# Patient Record
Sex: MALE | Race: OTHER | HISPANIC OR LATINO | URBAN - METROPOLITAN AREA
[De-identification: names, ages, dates, MRNs, and addresses within clinical notes are randomized per-mention and may not be internally consistent; named-entity substitution may affect disease eponyms.]

---

## 2023-06-22 ENCOUNTER — INPATIENT (INPATIENT)
Facility: HOSPITAL | Age: 73
LOS: 3 days | Discharge: HOME | DRG: 337 | End: 2023-06-26
Attending: STUDENT IN AN ORGANIZED HEALTH CARE EDUCATION/TRAINING PROGRAM | Admitting: STUDENT IN AN ORGANIZED HEALTH CARE EDUCATION/TRAINING PROGRAM
Payer: COMMERCIAL

## 2023-06-22 VITALS
DIASTOLIC BLOOD PRESSURE: 80 MMHG | HEART RATE: 56 BPM | HEIGHT: 63 IN | SYSTOLIC BLOOD PRESSURE: 125 MMHG | TEMPERATURE: 98 F | WEIGHT: 200.62 LBS | OXYGEN SATURATION: 98 % | RESPIRATION RATE: 16 BRPM

## 2023-06-22 DIAGNOSIS — Z98.890 OTHER SPECIFIED POSTPROCEDURAL STATES: Chronic | ICD-10-CM

## 2023-06-22 DIAGNOSIS — Z93.3 COLOSTOMY STATUS: Chronic | ICD-10-CM

## 2023-06-22 DIAGNOSIS — Z87.19 PERSONAL HISTORY OF OTHER DISEASES OF THE DIGESTIVE SYSTEM: Chronic | ICD-10-CM

## 2023-06-22 LAB — GLUCOSE BLDC GLUCOMTR-MCNC: 101 MG/DL — HIGH (ref 70–99)

## 2023-06-22 PROCEDURE — 88302 TISSUE EXAM BY PATHOLOGIST: CPT | Mod: 26

## 2023-06-22 DEVICE — MESH HERNIA VENTRAL / INCISIONAL PARIETEX PROGRIP 30 X 15CM: Type: IMPLANTABLE DEVICE | Site: BILATERAL | Status: FUNCTIONAL

## 2023-06-22 RX ORDER — ACETAMINOPHEN 500 MG
1000 TABLET ORAL ONCE
Refills: 0 | Status: COMPLETED | OUTPATIENT
Start: 2023-06-22 | End: 2023-06-22

## 2023-06-22 RX ORDER — ONDANSETRON 8 MG/1
4 TABLET, FILM COATED ORAL EVERY 8 HOURS
Refills: 0 | Status: DISCONTINUED | OUTPATIENT
Start: 2023-06-22 | End: 2023-06-26

## 2023-06-22 RX ORDER — METOPROLOL TARTRATE 50 MG
25 TABLET ORAL DAILY
Refills: 0 | Status: DISCONTINUED | OUTPATIENT
Start: 2023-06-23 | End: 2023-06-26

## 2023-06-22 RX ORDER — TAMSULOSIN HYDROCHLORIDE 0.4 MG/1
0.4 CAPSULE ORAL AT BEDTIME
Refills: 0 | Status: DISCONTINUED | OUTPATIENT
Start: 2023-06-22 | End: 2023-06-26

## 2023-06-22 RX ORDER — METOPROLOL TARTRATE 50 MG
1 TABLET ORAL
Refills: 0 | DISCHARGE

## 2023-06-22 RX ORDER — HEPARIN SODIUM 5000 [USP'U]/ML
7500 INJECTION INTRAVENOUS; SUBCUTANEOUS EVERY 8 HOURS
Refills: 0 | Status: DISCONTINUED | OUTPATIENT
Start: 2023-06-22 | End: 2023-06-26

## 2023-06-22 RX ORDER — TAMSULOSIN HYDROCHLORIDE 0.4 MG/1
1 CAPSULE ORAL
Refills: 0 | DISCHARGE

## 2023-06-22 RX ORDER — SODIUM CHLORIDE 9 MG/ML
1000 INJECTION, SOLUTION INTRAVENOUS
Refills: 0 | Status: DISCONTINUED | OUTPATIENT
Start: 2023-06-22 | End: 2023-06-24

## 2023-06-22 RX ADMIN — SODIUM CHLORIDE 70 MILLILITER(S): 9 INJECTION, SOLUTION INTRAVENOUS at 16:31

## 2023-06-22 RX ADMIN — Medication 1000 MILLIGRAM(S): at 16:45

## 2023-06-22 RX ADMIN — HEPARIN SODIUM 7500 UNIT(S): 5000 INJECTION INTRAVENOUS; SUBCUTANEOUS at 19:25

## 2023-06-22 RX ADMIN — SODIUM CHLORIDE 70 MILLILITER(S): 9 INJECTION, SOLUTION INTRAVENOUS at 22:52

## 2023-06-22 RX ADMIN — TAMSULOSIN HYDROCHLORIDE 0.4 MILLIGRAM(S): 0.4 CAPSULE ORAL at 22:51

## 2023-06-22 RX ADMIN — Medication 400 MILLIGRAM(S): at 16:31

## 2023-06-22 NOTE — H&P ADULT - NSHPPHYSICALEXAM_GEN_ALL_CORE
Vital Signs Last 24 Hrs  T(C): 37.3 (22 Jun 2023 17:00), Max: 37.3 (22 Jun 2023 17:00)  T(F): 99.2 (22 Jun 2023 17:00), Max: 99.2 (22 Jun 2023 17:00)  HR: 80 (22 Jun 2023 15:50) (56 - 80)  BP: 125/85 (22 Jun 2023 15:50) (108/68 - 125/85)  BP(mean): 98 (22 Jun 2023 15:50) (82 - 98)  RR: 18 (22 Jun 2023 15:50) (7 - 26)  SpO2: 97% (22 Jun 2023 15:50) (94% - 100%)    Parameters below as of 22 Jun 2023 15:50  Patient On (Oxygen Delivery Method): nasal cannula  O2 Flow (L/min): 2    I&O's Detail    22 Jun 2023 07:01  -  22 Jun 2023 19:29  --------------------------------------------------------  IN:    Lactated Ringers: 420 mL  Total IN: 420 mL    OUT:    Bulb (mL): 30 mL    Indwelling Catheter - Urethral (mL): 548 mL  Total OUT: 578 mL    Total NET: -158 mL          General: NAD, resting comfortably in bed  C/V: NSR  Pulm: Nonlabored breathing, no respiratory distress  Abd: soft, NT/ND.  Extrem: WWP, no edema

## 2023-06-22 NOTE — H&P ADULT - NSICDXPASTMEDICALHX_GEN_ALL_CORE_FT
PAST MEDICAL HISTORY:  BPH (benign prostatic hyperplasia)     H/O ventral hernia     History of palpitations

## 2023-06-22 NOTE — CONSULT NOTE ADULT - SUBJECTIVE AND OBJECTIVE BOX
HPI: HPI:  73 YO M with PMH Of PVCs Obesity presented to [facility] for Open repair of recurrent incisional hernia of anterior abdominal wall using synthetic mesh and posterior component separation technique. patient seen post op doing well without complaints of palpitations or chets pain, patient to be monitored on tele for PVS on rhythm , tachycardia now controlled. pt has f/u with cardiology for PVC evaluation in clinic       PAST MEDICAL & SURGICAL HISTORY:  H/O ventral hernia      History of palpitations      H/O umbilical hernia repair      History of diverticulitis  COLON RESECTION          Allergies    No Known Allergies    Intolerances        MEDICATIONS  (STANDING):  acetaminophen   IVPB .. 1000 milliGRAM(s) IV Intermittent once  lactated ringers. 1000 milliLiter(s) (70 mL/Hr) IV Continuous <Continuous>  tamsulosin 0.4 milliGRAM(s) Oral at bedtime    MEDICATIONS  (PRN):  ondansetron Injectable 4 milliGRAM(s) IV Push every 8 hours PRN Nausea and/or Vomiting  oxycodone    5 mG/acetaminophen 325 mG 1 Tablet(s) Oral every 6 hours PRN Moderate Pain (4 - 6)      SOCIAL HISTORY:    FAMILY HISTORY:        Vital Signs Last 24 Hrs  T(C): 37.1 (22 Jun 2023 15:00), Max: 37.1 (22 Jun 2023 15:00)  T(F): 98.7 (22 Jun 2023 15:00), Max: 98.7 (22 Jun 2023 15:00)  HR: 80 (22 Jun 2023 15:50) (56 - 80)  BP: 125/85 (22 Jun 2023 15:50) (108/68 - 125/85)  BP(mean): 98 (22 Jun 2023 15:50) (82 - 98)  RR: 18 (22 Jun 2023 15:50) (7 - 26)  SpO2: 97% (22 Jun 2023 15:50) (94% - 100%)    Parameters below as of 22 Jun 2023 15:50  Patient On (Oxygen Delivery Method): nasal cannula  O2 Flow (L/min): 2      I&O's Summary    22 Jun 2023 07:01  -  22 Jun 2023 16:17  --------------------------------------------------------  IN: 420 mL / OUT: 578 mL / NET: -158 mL        LABS:                CAPILLARY BLOOD GLUCOSE      POCT Blood Glucose.: 101 mg/dL (22 Jun 2023 06:37)      Cultures:      PHYSICAL EXAM:  General: NAD, resting comfortably  HEENT: NC/AT, EOMI, normal hearing, no oral lesions, no LAD, neck supple  Pulmonary: Normal resp effort, CTA-B  Cardiovascular: NSR, no murmurs  Abdominal: Soft, mild tenderness dressings CDI / , no organomegaly  Groin: Soft, nontender, no ecchymosis/hematoma, no erythema, no edema.  Extremities: (+) DP/PT pulses. FROM, normal strength, no clubbing/cyanosis/erythema/edema  Neuro: A/O x 3, CNs II-XII grossly intact, normal sensation, no focal deficits  Pulses: Palpable distal pulses    RADIOLOGY & ADDITIONAL STUDIES:      ASSESSMENT:      PLAN:

## 2023-06-22 NOTE — H&P ADULT - HISTORY OF PRESENT ILLNESS
HPI:  72M with pmh of asxs PVCs, BPH, diverticulitis s/p bocanegra and reversal (~5 years ago in [place]) c/b ventral hernia s/p VHR who presents for incisional hernia repair with component separation. Saw cardiology preop and has Holter monitor (6/23) notable for 11% PVC burden. EKG notable for nonspecific ST segment changes. TTE (6/23) notable for EF 55-65%, no ischemic cardiomyopathy.     6/22- Modified alisha-stoppa repair- EBL 25, , IVF     PMH: PVCs, BPH, diverticulitis   PSH: Hartmman, incisional hernia repair  Allergies: NKDA  Medications: Toprol 25mg, Flomax 0.04  SH: Former cigar smoker, No EtOH use

## 2023-06-22 NOTE — PRE-OP CHECKLIST - SELECT TESTS ORDERED
HgBA1C 5.8/BMP/CBC/CMP/PT/PTT/INR/Urinalysis/EKG HgBA1C 5.8// /BMP/CBC/CMP/PT/PTT/INR/Urinalysis/EKG

## 2023-06-22 NOTE — BRIEF OPERATIVE NOTE - NSICDXBRIEFPROCEDURE_GEN_ALL_CORE_FT
PROCEDURES:  Open repair of recurrent incisional hernia of anterior abdominal wall using synthetic mesh and posterior component separation technique 22-Jun-2023 12:26:47  Eileen Horowitz

## 2023-06-22 NOTE — BRIEF OPERATIVE NOTE - OPERATION/FINDINGS
Midline laparotomy carried down to hernia sac. 8x10cm epigastric abdominal wall defect appreciated. Small bowel containing hernia dissected off hernia sac with careful sharp and blunt dissection. 1cm area of small bowel serosa imbricated with running vicryl. Fascial edges clean circumferentially Midline laparotomy carried down to hernia sac. 8x10cm epigastric abdominal wall defect appreciated. Small bowel containing hernia dissected off hernia sac with careful sharp and blunt dissection. 1cm area of small bowel serosa imbricated with running vicryl. Fascial edges clean circumferentially and sac sent for final. Posterior sheath dissected from rectus abdominis and reapproximated with running 2-0 PDS. Parietex Progrip mesh cut to fashion and placed retrorectus. Anterior fascia reapproximated with running Novofil. TAP block performed. 19F RAS left deep to SuQ. Camper's fascia reapproximated with running vicryl. Skin closed with Insorb stapler.

## 2023-06-22 NOTE — H&P ADULT - NSICDXPASTSURGICALHX_GEN_ALL_CORE_FT
PAST SURGICAL HISTORY:  H/O umbilical hernia repair     History of diverticulitis COLON RESECTION    Status post Carine procedure

## 2023-06-22 NOTE — H&P ADULT - ASSESSMENT
72M with pmh of asxs PVCs, BPH, diverticulitis s/p Emery and reversal (~5 years ago in [place]) c/b ventral hernia s/p VHR who presents for incisional hernia repair with component separation.     Consent signed and in chart  Ok to proceed to OR

## 2023-06-23 LAB
ANION GAP SERPL CALC-SCNC: 9 MMOL/L — SIGNIFICANT CHANGE UP (ref 5–17)
BUN SERPL-MCNC: 16 MG/DL — SIGNIFICANT CHANGE UP (ref 7–23)
CALCIUM SERPL-MCNC: 8.3 MG/DL — LOW (ref 8.4–10.5)
CHLORIDE SERPL-SCNC: 102 MMOL/L — SIGNIFICANT CHANGE UP (ref 96–108)
CO2 SERPL-SCNC: 27 MMOL/L — SIGNIFICANT CHANGE UP (ref 22–31)
CREAT SERPL-MCNC: 0.95 MG/DL — SIGNIFICANT CHANGE UP (ref 0.5–1.3)
EGFR: 85 ML/MIN/1.73M2 — SIGNIFICANT CHANGE UP
GLUCOSE SERPL-MCNC: 121 MG/DL — HIGH (ref 70–99)
HCT VFR BLD CALC: 37.3 % — LOW (ref 39–50)
HCV AB S/CO SERPL IA: 0.04 S/CO — SIGNIFICANT CHANGE UP
HCV AB SERPL-IMP: SIGNIFICANT CHANGE UP
HGB BLD-MCNC: 11.6 G/DL — LOW (ref 13–17)
MAGNESIUM SERPL-MCNC: 1.8 MG/DL — SIGNIFICANT CHANGE UP (ref 1.6–2.6)
MCHC RBC-ENTMCNC: 26.8 PG — LOW (ref 27–34)
MCHC RBC-ENTMCNC: 31.1 GM/DL — LOW (ref 32–36)
MCV RBC AUTO: 86.1 FL — SIGNIFICANT CHANGE UP (ref 80–100)
NRBC # BLD: 0 /100 WBCS — SIGNIFICANT CHANGE UP (ref 0–0)
PHOSPHATE SERPL-MCNC: 2.5 MG/DL — SIGNIFICANT CHANGE UP (ref 2.5–4.5)
PLATELET # BLD AUTO: 174 K/UL — SIGNIFICANT CHANGE UP (ref 150–400)
POTASSIUM SERPL-MCNC: 4.2 MMOL/L — SIGNIFICANT CHANGE UP (ref 3.5–5.3)
POTASSIUM SERPL-SCNC: 4.2 MMOL/L — SIGNIFICANT CHANGE UP (ref 3.5–5.3)
RBC # BLD: 4.33 M/UL — SIGNIFICANT CHANGE UP (ref 4.2–5.8)
RBC # FLD: 15.3 % — HIGH (ref 10.3–14.5)
SODIUM SERPL-SCNC: 138 MMOL/L — SIGNIFICANT CHANGE UP (ref 135–145)
WBC # BLD: 8.04 K/UL — SIGNIFICANT CHANGE UP (ref 3.8–10.5)
WBC # FLD AUTO: 8.04 K/UL — SIGNIFICANT CHANGE UP (ref 3.8–10.5)

## 2023-06-23 PROCEDURE — 99221 1ST HOSP IP/OBS SF/LOW 40: CPT

## 2023-06-23 RX ORDER — MAGNESIUM SULFATE 500 MG/ML
1 VIAL (ML) INJECTION ONCE
Refills: 0 | Status: COMPLETED | OUTPATIENT
Start: 2023-06-23 | End: 2023-06-23

## 2023-06-23 RX ORDER — SODIUM,POTASSIUM PHOSPHATES 278-250MG
1 POWDER IN PACKET (EA) ORAL ONCE
Refills: 0 | Status: COMPLETED | OUTPATIENT
Start: 2023-06-23 | End: 2023-06-23

## 2023-06-23 RX ORDER — BENZOCAINE AND MENTHOL 5; 1 G/100ML; G/100ML
1 LIQUID ORAL ONCE
Refills: 0 | Status: COMPLETED | OUTPATIENT
Start: 2023-06-23 | End: 2023-06-23

## 2023-06-23 RX ORDER — LANOLIN ALCOHOL/MO/W.PET/CERES
5 CREAM (GRAM) TOPICAL ONCE
Refills: 0 | Status: COMPLETED | OUTPATIENT
Start: 2023-06-23 | End: 2023-06-23

## 2023-06-23 RX ADMIN — HEPARIN SODIUM 7500 UNIT(S): 5000 INJECTION INTRAVENOUS; SUBCUTANEOUS at 04:41

## 2023-06-23 RX ADMIN — HEPARIN SODIUM 7500 UNIT(S): 5000 INJECTION INTRAVENOUS; SUBCUTANEOUS at 11:22

## 2023-06-23 RX ADMIN — HEPARIN SODIUM 7500 UNIT(S): 5000 INJECTION INTRAVENOUS; SUBCUTANEOUS at 19:24

## 2023-06-23 RX ADMIN — Medication 5 MILLIGRAM(S): at 04:41

## 2023-06-23 RX ADMIN — Medication 100 GRAM(S): at 10:42

## 2023-06-23 RX ADMIN — BENZOCAINE AND MENTHOL 1 LOZENGE: 5; 1 LIQUID ORAL at 00:04

## 2023-06-23 RX ADMIN — Medication 1 PACKET(S): at 10:42

## 2023-06-23 RX ADMIN — TAMSULOSIN HYDROCHLORIDE 0.4 MILLIGRAM(S): 0.4 CAPSULE ORAL at 21:15

## 2023-06-23 NOTE — PHYSICAL THERAPY INITIAL EVALUATION ADULT - GENERAL OBSERVATIONS, REHAB EVAL
PT IE completed. Chart reviewed. Patient without complaints of pain at rest, agreeable to PT. Patient received semi-supine, NAD, +tele, +3LO2NC, +abd binder intact, +1 abd RAS intact, +(L)IV, +B/L SCDs, HAL Alvarado cleared patient for treatment.

## 2023-06-23 NOTE — PHYSICAL THERAPY INITIAL EVALUATION ADULT - NSPTDISCHREC_GEN_A_CORE
Home with Home PT **pending increased gait and stairs (patient limited by 9/10 abdominal pain with mobility)

## 2023-06-23 NOTE — PHYSICAL THERAPY INITIAL EVALUATION ADULT - THERAPY FREQUENCY, PT EVAL
Patient educated on frequency of inpatient physical therapy at [facility], patient verbalized understanding./3-5x/week

## 2023-06-23 NOTE — PHYSICAL THERAPY INITIAL EVALUATION ADULT - PLANNED THERAPY INTERVENTIONS, PT EVAL
64 stair training/balance training/bed mobility training/gait training/strengthening/transfer training

## 2023-06-23 NOTE — PHYSICAL THERAPY INITIAL EVALUATION ADULT - PHYSICAL ASSIST/NONPHYSICAL ASSIST: SUPINE/SIT, REHAB EVAL
from (L)side-lying; able to bring B/L LEs off EOB with VCs; *significantly increased assist for trunk mobility 2/2 abd pain/verbal cues/nonverbal cues (demo/gestures)/1 person assist

## 2023-06-23 NOTE — PHYSICAL THERAPY INITIAL EVALUATION ADULT - PERTINENT HX OF CURRENT PROBLEM, REHAB EVAL
72M with pmh of asxs PVCs, BPH, diverticulitis s/p bocanegra and reversal (~5 years ago in [place]) c/b ventral hernia s/p VHR who presents for incisional hernia repair with component separation. Saw cardiology preop and has Holter monitor (6/23) notable for 11% PVC burden. EKG notable for nonspecific ST segment changes. TTE (6/23) notable for EF 55-65%, no ischemic cardiomyopathy. Please refer to H&P on Eads for remaining.

## 2023-06-23 NOTE — PHYSICAL THERAPY INITIAL EVALUATION ADULT - GAIT DEVIATIONS NOTED, PT EVAL
fairly steady gait, no LOB/knee buckling noted; *increased time required with turning/decreased aiyana/decreased step length

## 2023-06-23 NOTE — PROGRESS NOTE ADULT - SUBJECTIVE AND OBJECTIVE BOX
HPI: HPI:  71 YO M with PMH Of PVCs Obesity presented to [facility] for Open repair of recurrent incisional hernia of anterior abdominal wall using synthetic mesh and posterior component separation technique. patient seen post op doing well without complaints of palpitations or chets pain, patient to be monitored on tele for PVS on rhythm , tachycardia now controlled. pt has f/u with cardiology for PVC evaluation in clinic     patient seen and examined at bedside    feeling well without complaints  PVCs persistent on tele, cardiology consulted       PAST MEDICAL & SURGICAL HISTORY:  H/O ventral hernia      History of palpitations      H/O umbilical hernia repair      History of diverticulitis  COLON RESECTION          Allergies    No Known Allergies    Intolerances        MEDICATIONS  (STANDING):  acetaminophen   IVPB .. 1000 milliGRAM(s) IV Intermittent once  lactated ringers. 1000 milliLiter(s) (70 mL/Hr) IV Continuous <Continuous>  tamsulosin 0.4 milliGRAM(s) Oral at bedtime    MEDICATIONS  (PRN):  ondansetron Injectable 4 milliGRAM(s) IV Push every 8 hours PRN Nausea and/or Vomiting  oxycodone    5 mG/acetaminophen 325 mG 1 Tablet(s) Oral every 6 hours PRN Moderate Pain (4 - 6)      SOCIAL HISTORY:    FAMILY HISTORY:        Vital Signs Last 24 Hrs  T(C): 37.1 (22 Jun 2023 15:00), Max: 37.1 (22 Jun 2023 15:00)  T(F): 98.7 (22 Jun 2023 15:00), Max: 98.7 (22 Jun 2023 15:00)  HR: 80 (22 Jun 2023 15:50) (56 - 80)  BP: 125/85 (22 Jun 2023 15:50) (108/68 - 125/85)  BP(mean): 98 (22 Jun 2023 15:50) (82 - 98)  RR: 18 (22 Jun 2023 15:50) (7 - 26)  SpO2: 97% (22 Jun 2023 15:50) (94% - 100%)    Parameters below as of 22 Jun 2023 15:50  Patient On (Oxygen Delivery Method): nasal cannula  O2 Flow (L/min): 2      I&O's Summary    22 Jun 2023 07:01  -  22 Jun 2023 16:17  --------------------------------------------------------  IN: 420 mL / OUT: 578 mL / NET: -158 mL        LABS:                CAPILLARY BLOOD GLUCOSE      POCT Blood Glucose.: 101 mg/dL (22 Jun 2023 06:37)      Cultures:      PHYSICAL EXAM:  General: NAD, resting comfortably  HEENT: NC/AT, EOMI, normal hearing, no oral lesions, no LAD, neck supple  Pulmonary: Normal resp effort, CTA-B  Cardiovascular: NSR, no murmurs  Abdominal: Soft, mild tenderness dressings CDI / , no organomegaly  Groin: Soft, nontender, no ecchymosis/hematoma, no erythema, no edema.  Extremities: (+) DP/PT pulses. FROM, normal strength, no clubbing/cyanosis/erythema/edema  Neuro: A/O x 3, CNs II-XII grossly intact, normal sensation, no focal deficits  Pulses: Palpable distal pulses    RADIOLOGY & ADDITIONAL STUDIES:      ASSESSMENT:      PLAN:

## 2023-06-23 NOTE — PHYSICAL THERAPY INITIAL EVALUATION ADULT - PHYSICAL ASSIST/NONPHYSICAL ASSIST, REHAB EVAL
rolling to (L) side for abdominal comfort/verbal cues/nonverbal cues (demo/gestures)/1 person assist

## 2023-06-23 NOTE — PHYSICAL THERAPY INITIAL EVALUATION ADULT - PHYSICAL ASSIST/NONPHYSICAL ASSIST: SIT/STAND, REHAB EVAL
slightly unsteady; *increased time required to complete functional task/verbal cues/nonverbal cues (demo/gestures)/1 person assist

## 2023-06-23 NOTE — PHYSICAL THERAPY INITIAL EVALUATION ADULT - DID THE PATIENT HAVE SURGERY?
Open repair of recurrent incisional hernia of anterior abdominal wall using synthetic mesh and posterior component separation technique/yes

## 2023-06-23 NOTE — PHYSICAL THERAPY INITIAL EVALUATION ADULT - ADDITIONAL COMMENTS
Patient reports previously independent with all ADLs/IADLs prior to admission. No HHA. Denies history of mechanical falls. Patient reports "it's already been set-up that I will have 2 people staying with me to help as needed once I leave the hospital".

## 2023-06-23 NOTE — CONSULT NOTE ADULT - ATTENDING COMMENTS
Initial attending contact date  6/23/23    . See fellow note written above for details. I reviewed the fellow documentation. I have personally seen and examined this patient. I reviewed vitals, labs, medications, cardiac studies, and additional imaging. I agree with the above fellow's findings and plans as written above with the following additions/statements.    72M with pmh of asxs PVCs, BPH, diverticulitis s/p bocanegra and reversal (~5 years ago in [place]) c/b ventral hernia s/p VHR who presents for incisional hernia repair with component separation. Saw cardiology preop and has Holter monitor (6/23) notable for 11% PVC burden. EKG notable for nonspecific ST segment changes. TTE (6/23) notable for EF 55-65%, no ischemic cardiomyopathy. Recent outpatient stress echo - negative for ischemia  -On tele; NSR with rare APCs  -On exam euvolemic without cardiac complaints  -Cont metoprolol  -Fu with outpatient cardiologist upon dc

## 2023-06-23 NOTE — PROGRESS NOTE ADULT - SUBJECTIVE AND OBJECTIVE BOX
STATUS POST:  Modified Cyndi Stoppa Repair    POST OPERATIVE DAY #: 1    SUBJECTIVE: Pt seen and examined at bedside this am by surgery team. Patient is lying comfortably in bed with no complaints. Tolerating clears, pain well controlled with current regimen. Patient denies fever, nausea, vomiting, chest pain, and shortness of breath.    MEDICATIONS  (STANDING):  heparin   Injectable 7500 Unit(s) SubCutaneous every 8 hours  lactated ringers. 1000 milliLiter(s) (70 mL/Hr) IV Continuous <Continuous>  metoprolol succinate ER 25 milliGRAM(s) Oral daily  tamsulosin 0.4 milliGRAM(s) Oral at bedtime    MEDICATIONS  (PRN):  ondansetron Injectable 4 milliGRAM(s) IV Push every 8 hours PRN Nausea and/or Vomiting  oxycodone    5 mG/acetaminophen 325 mG 1 Tablet(s) Oral every 6 hours PRN Moderate Pain (4 - 6)      Vital Signs Last 24 Hrs  T(C): 37.3 (23 Jun 2023 05:06), Max: 37.3 (22 Jun 2023 17:00)  T(F): 99.2 (23 Jun 2023 05:06), Max: 99.2 (22 Jun 2023 17:00)  HR: 54 (23 Jun 2023 04:05) (54 - 80)  BP: 119/77 (23 Jun 2023 04:05) (108/68 - 125/85)  BP(mean): 94 (23 Jun 2023 04:05) (82 - 98)  RR: 17 (23 Jun 2023 04:05) (7 - 26)  SpO2: 90% (23 Jun 2023 04:05) (90% - 100%)    Parameters below as of 23 Jun 2023 04:05  Patient On (Oxygen Delivery Method): room air    Physical Exam  General: Patient is doing well and lying in bed comfortably  Constitutional: alert and oriented   Pulm: Nonlabored breathing, no respiratory distress  CV: Regular rate and rhythm, normal sinus rhythm  Abd:  soft, nontender, mildly tender. No rebound, no guarding.             Incisions: clean, dry, intact and healing well, no erythema/induration/edema  Extremities: warm, well perfused, no edema  Drains: RAS x 1, serosang    I&O's Detail    22 Jun 2023 07:01  -  23 Jun 2023 07:00  --------------------------------------------------------  IN:    Lactated Ringers: 1190 mL  Total IN: 1190 mL    OUT:    Bulb (mL): 65 mL    Indwelling Catheter - Urethral (mL): 1723 mL  Total OUT: 1788 mL    Total NET: -598 mL        LABS:             STATUS POST:  Modified Cyndi Stoppa Repair    POST OPERATIVE DAY #: 1    SUBJECTIVE: Pt seen and examined at bedside this am by surgery team. Patient is lying comfortably in bed, complains of mild nausea and unable to sleep - received melatonin. Tolerating clears, pain well controlled with current regimen. Patient denies fever, vomiting, chest pain, and shortness of breath.    MEDICATIONS  (STANDING):  heparin   Injectable 7500 Unit(s) SubCutaneous every 8 hours  lactated ringers. 1000 milliLiter(s) (70 mL/Hr) IV Continuous <Continuous>  metoprolol succinate ER 25 milliGRAM(s) Oral daily  tamsulosin 0.4 milliGRAM(s) Oral at bedtime    MEDICATIONS  (PRN):  ondansetron Injectable 4 milliGRAM(s) IV Push every 8 hours PRN Nausea and/or Vomiting  oxycodone    5 mG/acetaminophen 325 mG 1 Tablet(s) Oral every 6 hours PRN Moderate Pain (4 - 6)      Vital Signs Last 24 Hrs  T(C): 37.3 (23 Jun 2023 05:06), Max: 37.3 (22 Jun 2023 17:00)  T(F): 99.2 (23 Jun 2023 05:06), Max: 99.2 (22 Jun 2023 17:00)  HR: 54 (23 Jun 2023 04:05) (54 - 80)  BP: 119/77 (23 Jun 2023 04:05) (108/68 - 125/85)  BP(mean): 94 (23 Jun 2023 04:05) (82 - 98)  RR: 17 (23 Jun 2023 04:05) (7 - 26)  SpO2: 90% (23 Jun 2023 04:05) (90% - 100%)    Parameters below as of 23 Jun 2023 04:05  Patient On (Oxygen Delivery Method): room air    Physical Exam  General: Patient is doing well and lying in bed comfortably  Constitutional: alert and oriented   Pulm: Nonlabored breathing, no respiratory distress  CV: Regular rate and rhythm, normal sinus rhythm  Abd:  soft, nontender, mildly tender. No rebound, no guarding.             Incisions: clean, dry, intact and healing well, no erythema/induration/edema  Extremities: warm, well perfused, no edema  Drains: RAS x 1, serosang    I&O's Detail    22 Jun 2023 07:01  -  23 Jun 2023 07:00  --------------------------------------------------------  IN:    Lactated Ringers: 1190 mL  Total IN: 1190 mL    OUT:    Bulb (mL): 65 mL    Indwelling Catheter - Urethral (mL): 1723 mL  Total OUT: 1788 mL    Total NET: -598 mL        LABS:

## 2023-06-23 NOTE — CONSULT NOTE ADULT - ASSESSMENT
I:  73 YO M with PMH Of PVCs Obesity presented to [facility] for Open repair of recurrent incisional hernia of anterior abdominal wall using synthetic mesh and posterior component separation technique. patient seen post op doing well without complaints of palpitations or chets pain, patient to be monitored on tele for PVS on rhythm , tachycardia now controlled. pt has f/u with cardiology for PVC evaluation in clinic     BPH- cont tamsulosin   PVCs - monitor on tele, beta blockers for rate control   SCDs / DVT ppx as per surgery / pain management     thanks for this consult !!!
72M PMH PVCs, diverticulitis s/p Emery procedure presents for hernia repair, now POD1.      #PVCs  TELE with NSR, rare PAC, no PVC  Patient asymptomatic  -Continue home Toprol  -Outpatient Cardiology follow-up

## 2023-06-23 NOTE — CONSULT NOTE ADULT - SUBJECTIVE AND OBJECTIVE BOX
HPI:  HPI:  72M with pmh of asxs PVCs, BPH, diverticulitis s/p bocanegra and reversal (~5 years ago in [place]) c/b ventral hernia s/p VHR who presents for incisional hernia repair with component separation. Saw cardiology preop and has Holter monitor (6/23) notable for 11% PVC burden. EKG notable for nonspecific ST segment changes. TTE (6/23) notable for EF 55-65%, no ischemic cardiomyopathy.     6/22- Modified alisha-stoppa repair- EBL 25, , IVF     PMH: PVCs, BPH, diverticulitis   PSH: Hartmman, incisional hernia repair  Allergies: NKDA  Medications: Toprol 25mg, Flomax 0.04  SH: Former cigar smoker, No EtOH use                   (22 Jun 2023 19:28)      ROS: A 10-point review of systems was otherwise negative.    PAST MEDICAL & SURGICAL HISTORY:  H/O ventral hernia      History of palpitations      BPH (benign prostatic hyperplasia)      H/O umbilical hernia repair      History of diverticulitis  COLON RESECTION      Status post Carine procedure          SOCIAL HISTORY:  FAMILY HISTORY:      ALLERGIES: 	  No Known Allergies            MEDICATIONS:  heparin   Injectable 7500 Unit(s) SubCutaneous every 8 hours  lactated ringers. 1000 milliLiter(s) IV Continuous <Continuous>  metoprolol succinate ER 25 milliGRAM(s) Oral daily  ondansetron Injectable 4 milliGRAM(s) IV Push every 8 hours PRN  oxycodone    5 mG/acetaminophen 325 mG 1 Tablet(s) Oral every 6 hours PRN  tamsulosin 0.4 milliGRAM(s) Oral at bedtime      HOME MEDICATIONS:  metoprolol succinate 25 mg oral capsule, extended release: 1 orally once a day  tamsulosin 0.4 mg oral capsule: 1 orally once a day (at bedtime)      PHYSICAL EXAM:      I/O Summary 24H    IN: 1190 mL / OUT: 1788 mL / NET: -598 mL        T(F): 98.6 (06-23-23 @ 09:00), Max: 99.2 (06-22-23 @ 17:00)  HR: 60 (06-23-23 @ 08:18) (54 - 80)  BP: 109/64 (06-23-23 @ 08:18) (108/68 - 125/85)  BP(mean): 82 (06-23-23 @ 08:18) (82 - 98)  ABP: --  ABP(mean): --  RR: 17 (06-23-23 @ 08:18) (7 - 26)  SpO2: 91% (06-23-23 @ 08:18) (90% - 100%)    GEN: Awake, comfortable. NAD.   HEENT: NCAT, PERRL, EOMI. Mucosa moist. No JVD.   RESP: CTA b/l  CV: RRR, normal s1/s2. No m/r/g.  ABD: Soft, NTND. BS+  EXT: Warm. No edema, clubbing, or cyanosis.   NEURO: AAOx3. No focal deficits.      	  LABS:	 	    Cardiac Markers             CBC 06-23-23 @ 05:30                        11.6   8.04  )-----------( 174                   37.3       Hgb trend: 11.6 <--   WBC trend: 8.04 <--     CMP 06-23-23 @ 05:30    138  |  102  |  16  ----------------------------<  121<H>  4.2   |  27  |  0.95    Ca    8.3<L>      06-23-23 @ 05:30  Phos  2.5     06-23  Mg     1.8     06-23        Serum Cr trend: 0.95 <--   proBNP:   Lipid Profile:   HgA1c:   TSH:     TELEMETRY: 	    ECG:  	  RADIOLOGY:   ECHO:  STRESS:  CATH:   HPI:  HPI:  72M with pmh of asxs PVCs, BPH, diverticulitis s/p bocanegra and reversal (~5 years ago in [place]) c/b ventral hernia s/p VHR who presents for incisional hernia repair with component separation. Saw cardiology preop and has Holter monitor (6/23) notable for 11% PVC burden. EKG notable for nonspecific ST segment changes. TTE (6/23) notable for EF 55-65%, no ischemic cardiomyopathy.     6/22- Modified alisha-stoppa repair- EBL 25, , IVF     PMH: PVCs, BPH, diverticulitis   PSH: Hartmman, incisional hernia repair  Allergies: NKDA  Medications: Toprol 25mg, Flomax 0.04  SH: Former cigar smoker, No EtOH use                   (22 Jun 2023 19:28)      Today, patient denies chest pain, SOB, palpitations      ROS: A 10-point review of systems was otherwise negative.    PAST MEDICAL & SURGICAL HISTORY:  H/O ventral hernia      History of palpitations      BPH (benign prostatic hyperplasia)      H/O umbilical hernia repair      History of diverticulitis  COLON RESECTION      Status post Carine procedure          SOCIAL HISTORY:  FAMILY HISTORY:      ALLERGIES: 	  No Known Allergies            MEDICATIONS:  heparin   Injectable 7500 Unit(s) SubCutaneous every 8 hours  lactated ringers. 1000 milliLiter(s) IV Continuous <Continuous>  metoprolol succinate ER 25 milliGRAM(s) Oral daily  ondansetron Injectable 4 milliGRAM(s) IV Push every 8 hours PRN  oxycodone    5 mG/acetaminophen 325 mG 1 Tablet(s) Oral every 6 hours PRN  tamsulosin 0.4 milliGRAM(s) Oral at bedtime      HOME MEDICATIONS:  metoprolol succinate 25 mg oral capsule, extended release: 1 orally once a day  tamsulosin 0.4 mg oral capsule: 1 orally once a day (at bedtime)      PHYSICAL EXAM:      I/O Summary 24H    IN: 1190 mL / OUT: 1788 mL / NET: -598 mL        T(F): 98.6 (06-23-23 @ 09:00), Max: 99.2 (06-22-23 @ 17:00)  HR: 60 (06-23-23 @ 08:18) (54 - 80)  BP: 109/64 (06-23-23 @ 08:18) (108/68 - 125/85)  BP(mean): 82 (06-23-23 @ 08:18) (82 - 98)  ABP: --  ABP(mean): --  RR: 17 (06-23-23 @ 08:18) (7 - 26)  SpO2: 91% (06-23-23 @ 08:18) (90% - 100%)    GEN: Awake, comfortable. NAD.   HEENT: NCAT, PERRL, EOMI. Mucosa moist. No JVD.   RESP: CTA b/l  CV: RRR, normal s1/s2. No m/r/g.  ABD: Soft, NTND. BS+  EXT: Warm. No edema, clubbing, or cyanosis.   NEURO: AAOx3. No focal deficits.      	  LABS:	 	    Cardiac Markers             CBC 06-23-23 @ 05:30                        11.6   8.04  )-----------( 174                   37.3       Hgb trend: 11.6 <--   WBC trend: 8.04 <--     CMP 06-23-23 @ 05:30    138  |  102  |  16  ----------------------------<  121<H>  4.2   |  27  |  0.95    Ca    8.3<L>      06-23-23 @ 05:30  Phos  2.5     06-23  Mg     1.8     06-23        Serum Cr trend: 0.95 <--   proBNP:   Lipid Profile:   HgA1c:   TSH:     TELEMETRY: 	    ECG:  	  RADIOLOGY:   ECHO:  STRESS:  CATH:

## 2023-06-24 LAB
ANION GAP SERPL CALC-SCNC: 6 MMOL/L — SIGNIFICANT CHANGE UP (ref 5–17)
BILIRUB SERPL-MCNC: 0.6 MG/DL — SIGNIFICANT CHANGE UP (ref 0.2–1.2)
BUN SERPL-MCNC: 15 MG/DL — SIGNIFICANT CHANGE UP (ref 7–23)
CALCIUM SERPL-MCNC: 7.7 MG/DL — LOW (ref 8.4–10.5)
CHLORIDE SERPL-SCNC: 101 MMOL/L — SIGNIFICANT CHANGE UP (ref 96–108)
CO2 SERPL-SCNC: 27 MMOL/L — SIGNIFICANT CHANGE UP (ref 22–31)
CREAT SERPL-MCNC: 0.9 MG/DL — SIGNIFICANT CHANGE UP (ref 0.5–1.3)
EGFR: 90 ML/MIN/1.73M2 — SIGNIFICANT CHANGE UP
GLUCOSE SERPL-MCNC: 121 MG/DL — HIGH (ref 70–99)
HCT VFR BLD CALC: 35.5 % — LOW (ref 39–50)
HGB BLD-MCNC: 11.2 G/DL — LOW (ref 13–17)
INR BLD: 1.14 — SIGNIFICANT CHANGE UP (ref 0.88–1.16)
MAGNESIUM SERPL-MCNC: 2 MG/DL — SIGNIFICANT CHANGE UP (ref 1.6–2.6)
MCHC RBC-ENTMCNC: 26.9 PG — LOW (ref 27–34)
MCHC RBC-ENTMCNC: 31.5 GM/DL — LOW (ref 32–36)
MCV RBC AUTO: 85.3 FL — SIGNIFICANT CHANGE UP (ref 80–100)
MELD SCORE WITH DIALYSIS: 23 POINTS — SIGNIFICANT CHANGE UP
MELD SCORE WITHOUT DIALYSIS: 8 POINTS — SIGNIFICANT CHANGE UP
NRBC # BLD: 0 /100 WBCS — SIGNIFICANT CHANGE UP (ref 0–0)
PHOSPHATE SERPL-MCNC: 2.2 MG/DL — LOW (ref 2.5–4.5)
PLATELET # BLD AUTO: 166 K/UL — SIGNIFICANT CHANGE UP (ref 150–400)
POTASSIUM SERPL-MCNC: 4.2 MMOL/L — SIGNIFICANT CHANGE UP (ref 3.5–5.3)
POTASSIUM SERPL-SCNC: 4.2 MMOL/L — SIGNIFICANT CHANGE UP (ref 3.5–5.3)
PROTHROM AB SERPL-ACNC: 13.6 SEC — HIGH (ref 10.5–13.4)
RBC # BLD: 4.16 M/UL — LOW (ref 4.2–5.8)
RBC # FLD: 15.5 % — HIGH (ref 10.3–14.5)
SODIUM SERPL-SCNC: 134 MMOL/L — LOW (ref 135–145)
WBC # BLD: 8.9 K/UL — SIGNIFICANT CHANGE UP (ref 3.8–10.5)
WBC # FLD AUTO: 8.9 K/UL — SIGNIFICANT CHANGE UP (ref 3.8–10.5)

## 2023-06-24 RX ORDER — DIAZEPAM 5 MG
2 TABLET ORAL ONCE
Refills: 0 | Status: DISCONTINUED | OUTPATIENT
Start: 2023-06-24 | End: 2023-06-24

## 2023-06-24 RX ORDER — SODIUM,POTASSIUM PHOSPHATES 278-250MG
1 POWDER IN PACKET (EA) ORAL ONCE
Refills: 0 | Status: COMPLETED | OUTPATIENT
Start: 2023-06-24 | End: 2023-06-24

## 2023-06-24 RX ADMIN — Medication 1 PACKET(S): at 11:02

## 2023-06-24 RX ADMIN — HEPARIN SODIUM 7500 UNIT(S): 5000 INJECTION INTRAVENOUS; SUBCUTANEOUS at 19:38

## 2023-06-24 RX ADMIN — Medication 25 MILLIGRAM(S): at 05:04

## 2023-06-24 RX ADMIN — Medication 2 MILLIGRAM(S): at 04:40

## 2023-06-24 RX ADMIN — TAMSULOSIN HYDROCHLORIDE 0.4 MILLIGRAM(S): 0.4 CAPSULE ORAL at 22:20

## 2023-06-24 RX ADMIN — HEPARIN SODIUM 7500 UNIT(S): 5000 INJECTION INTRAVENOUS; SUBCUTANEOUS at 11:09

## 2023-06-24 RX ADMIN — HEPARIN SODIUM 7500 UNIT(S): 5000 INJECTION INTRAVENOUS; SUBCUTANEOUS at 04:38

## 2023-06-24 NOTE — PROVIDER CONTACT NOTE (OTHER) - SITUATION
dry bright red blood on tip of penis and on front of patient gown
pt voids 50ml but is retaining 631 as per bladder scanner. pt doesn't want to be bladder scan due to bleeding last time it was done. states it was traumatic

## 2023-06-24 NOTE — PROGRESS NOTE ADULT - SUBJECTIVE AND OBJECTIVE BOX
Interventional Cardiology Adult Progress Note    Subjective Assessment:  Denies any chest pain or dyspnea  	  MEDICATIONS:  metoprolol succinate ER 25 milliGRAM(s) Oral daily        ondansetron Injectable 4 milliGRAM(s) IV Push every 8 hours PRN  oxycodone    5 mG/acetaminophen 325 mG 1 Tablet(s) Oral every 6 hours PRN        heparin   Injectable 7500 Unit(s) SubCutaneous every 8 hours  lactated ringers. 1000 milliLiter(s) IV Continuous <Continuous>  tamsulosin 0.4 milliGRAM(s) Oral at bedtime      	    [PHYSICAL EXAM:  TELEMETRY:  T(C): 37.2 (06-24-23 @ 09:00), Max: 37.9 (06-24-23 @ 04:59)  HR: 58 (06-24-23 @ 08:30) (58 - 88)  BP: 112/74 (06-24-23 @ 08:30) (112/74 - 133/75)  RR: 17 (06-24-23 @ 08:30) (17 - 17)  SpO2: 97% (06-24-23 @ 08:30) (92% - 97%)  Wt(kg): --  I&O's Summary    23 Jun 2023 07:01  -  24 Jun 2023 07:00  --------------------------------------------------------  IN: 1290 mL / OUT: 1930 mL / NET: -640 mL    24 Jun 2023 07:01  -  24 Jun 2023 13:39  --------------------------------------------------------  IN: 210 mL / OUT: 105 mL / NET: 105 mL        Desai:  Central/PICC/Mid Line:                                         Appearance: Normal	  HEENT:   Normal oral mucosa, PERRL, EOMI	  Neck: Supple,  - JVD; Carotid Bruit   Cardiovascular: Normal S1 S2, No JVD, ETHAN  Respiratory: Lungs clear to auscultation	  Gastrointestinal:  Soft, Non-tender, 	  Extremities: Normal range of motion, No edema    	    ECG:  	NSR  RADIOLOGY:   DIAGNOSTIC TESTING:  [ ] Echocardiogram:  [ ]  Catheterization:  [ ] Stress Test:    [ ] WILLY  OTHER: 	    LABS:	 	  CARDIAC MARKERS:                                  11.2   8.90  )-----------( 166      ( 24 Jun 2023 05:30 )             35.5     06-24    134<L>  |  101  |  15  ----------------------------<  121<H>  4.2   |  27  |  0.90    Ca    7.7<L>      24 Jun 2023 05:30  Phos  2.2     06-24  Mg     2.0     06-24    TPro  x   /  Alb  x   /  TBili  0.6  /  DBili  x   /  AST  x   /  ALT  x   /  AlkPhos  x   06-24    proBNP:   Lipid Profile:   HgA1c:   TSH:   PT/INR - ( 24 Jun 2023 05:30 )   PT: 13.6 sec;   INR: 1.14

## 2023-06-24 NOTE — CHART NOTE - NSCHARTNOTEFT_GEN_A_CORE
Patient repeatedly refusing catheterization for urinary retention. Patient is voiding frequent small voids however PVR remains consistently high. Patient educated about risks of urinary retention, including pain, UTI, and reflux. Patient states that he understands risks and would like to continue ambulating and attempting to void on his own. Resident will continue to monitor serial PVR and continue discussions regarding catheterization.

## 2023-06-24 NOTE — PROGRESS NOTE ADULT - SUBJECTIVE AND OBJECTIVE BOX
HPI: HPI:  71 YO M with PMH Of PVCs Obesity presented to [facility] for Open repair of recurrent incisional hernia of anterior abdominal wall using synthetic mesh and posterior component separation technique. patient seen post op doing well without complaints of palpitations or chets pain, patient to be monitored on tele for PVS on rhythm , tachycardia now controlled. pt has f/u with cardiology for PVC evaluation in clinic     patient seen and examined at bedside    feeling well without complaints  PVCs persistent on tele, cardiology consulted       PAST MEDICAL & SURGICAL HISTORY:  H/O ventral hernia      History of palpitations      H/O umbilical hernia repair      History of diverticulitis  COLON RESECTION          Allergies    No Known Allergies    Intolerances        MEDICATIONS  (STANDING):  acetaminophen   IVPB .. 1000 milliGRAM(s) IV Intermittent once  lactated ringers. 1000 milliLiter(s) (70 mL/Hr) IV Continuous <Continuous>  tamsulosin 0.4 milliGRAM(s) Oral at bedtime    MEDICATIONS  (PRN):  ondansetron Injectable 4 milliGRAM(s) IV Push every 8 hours PRN Nausea and/or Vomiting  oxycodone    5 mG/acetaminophen 325 mG 1 Tablet(s) Oral every 6 hours PRN Moderate Pain (4 - 6)      SOCIAL HISTORY:    FAMILY HISTORY:        Vital Signs Last 24 Hrs  T(C): 37.1 (22 Jun 2023 15:00), Max: 37.1 (22 Jun 2023 15:00)  T(F): 98.7 (22 Jun 2023 15:00), Max: 98.7 (22 Jun 2023 15:00)  HR: 80 (22 Jun 2023 15:50) (56 - 80)  BP: 125/85 (22 Jun 2023 15:50) (108/68 - 125/85)  BP(mean): 98 (22 Jun 2023 15:50) (82 - 98)  RR: 18 (22 Jun 2023 15:50) (7 - 26)  SpO2: 97% (22 Jun 2023 15:50) (94% - 100%)    Parameters below as of 22 Jun 2023 15:50  Patient On (Oxygen Delivery Method): nasal cannula  O2 Flow (L/min): 2      I&O's Summary    22 Jun 2023 07:01  -  22 Jun 2023 16:17  --------------------------------------------------------  IN: 420 mL / OUT: 578 mL / NET: -158 mL        LABS:                CAPILLARY BLOOD GLUCOSE      POCT Blood Glucose.: 101 mg/dL (22 Jun 2023 06:37)      Cultures:      PHYSICAL EXAM:  General: NAD, resting comfortably  HEENT: NC/AT, EOMI, normal hearing, no oral lesions, no LAD, neck supple  Pulmonary: Normal resp effort, CTA-B  Cardiovascular: NSR, no murmurs  Abdominal: Soft, mild tenderness dressings CDI / , no organomegaly  Groin: Soft, nontender, no ecchymosis/hematoma, no erythema, no edema.  Extremities: (+) DP/PT pulses. FROM, normal strength, no clubbing/cyanosis/erythema/edema  Neuro: A/O x 3, CNs II-XII grossly intact, normal sensation, no focal deficits  Pulses: Palpable distal pulses    RADIOLOGY & ADDITIONAL STUDIES:      ASSESSMENT:      PLAN:

## 2023-06-24 NOTE — PROVIDER CONTACT NOTE (OTHER) - ACTION/TREATMENT ORDERED:
MD aware; will continue to monitor and do PVRs. pt educated on urinary retention. pt aware of consequenaces

## 2023-06-24 NOTE — PROVIDER CONTACT NOTE (OTHER) - RECOMMENDATIONS
pt states he will ambulate; have water running to stimulate bladder; doesn't want to be straight cath.
monitor pt no intervention at this time as per MD Chuck Estevez

## 2023-06-25 DIAGNOSIS — Z98.890 OTHER SPECIFIED POSTPROCEDURAL STATES: ICD-10-CM

## 2023-06-25 LAB
ANION GAP SERPL CALC-SCNC: 8 MMOL/L — SIGNIFICANT CHANGE UP (ref 5–17)
BUN SERPL-MCNC: 17 MG/DL — SIGNIFICANT CHANGE UP (ref 7–23)
CALCIUM SERPL-MCNC: 8.1 MG/DL — LOW (ref 8.4–10.5)
CHLORIDE SERPL-SCNC: 103 MMOL/L — SIGNIFICANT CHANGE UP (ref 96–108)
CO2 SERPL-SCNC: 26 MMOL/L — SIGNIFICANT CHANGE UP (ref 22–31)
CREAT SERPL-MCNC: 0.81 MG/DL — SIGNIFICANT CHANGE UP (ref 0.5–1.3)
EGFR: 93 ML/MIN/1.73M2 — SIGNIFICANT CHANGE UP
GLUCOSE SERPL-MCNC: 100 MG/DL — HIGH (ref 70–99)
HCT VFR BLD CALC: 34.9 % — LOW (ref 39–50)
HGB BLD-MCNC: 10.9 G/DL — LOW (ref 13–17)
MAGNESIUM SERPL-MCNC: 2 MG/DL — SIGNIFICANT CHANGE UP (ref 1.6–2.6)
MCHC RBC-ENTMCNC: 26.7 PG — LOW (ref 27–34)
MCHC RBC-ENTMCNC: 31.2 GM/DL — LOW (ref 32–36)
MCV RBC AUTO: 85.5 FL — SIGNIFICANT CHANGE UP (ref 80–100)
NRBC # BLD: 0 /100 WBCS — SIGNIFICANT CHANGE UP (ref 0–0)
PHOSPHATE SERPL-MCNC: 2.2 MG/DL — LOW (ref 2.5–4.5)
PLATELET # BLD AUTO: 184 K/UL — SIGNIFICANT CHANGE UP (ref 150–400)
POTASSIUM SERPL-MCNC: 3.9 MMOL/L — SIGNIFICANT CHANGE UP (ref 3.5–5.3)
POTASSIUM SERPL-SCNC: 3.9 MMOL/L — SIGNIFICANT CHANGE UP (ref 3.5–5.3)
RBC # BLD: 4.08 M/UL — LOW (ref 4.2–5.8)
RBC # FLD: 15.3 % — HIGH (ref 10.3–14.5)
SODIUM SERPL-SCNC: 137 MMOL/L — SIGNIFICANT CHANGE UP (ref 135–145)
WBC # BLD: 7.12 K/UL — SIGNIFICANT CHANGE UP (ref 3.8–10.5)
WBC # FLD AUTO: 7.12 K/UL — SIGNIFICANT CHANGE UP (ref 3.8–10.5)

## 2023-06-25 RX ORDER — SODIUM,POTASSIUM PHOSPHATES 278-250MG
1 POWDER IN PACKET (EA) ORAL ONCE
Refills: 0 | Status: COMPLETED | OUTPATIENT
Start: 2023-06-25 | End: 2023-06-25

## 2023-06-25 RX ADMIN — TAMSULOSIN HYDROCHLORIDE 0.4 MILLIGRAM(S): 0.4 CAPSULE ORAL at 22:33

## 2023-06-25 RX ADMIN — HEPARIN SODIUM 7500 UNIT(S): 5000 INJECTION INTRAVENOUS; SUBCUTANEOUS at 20:02

## 2023-06-25 RX ADMIN — HEPARIN SODIUM 7500 UNIT(S): 5000 INJECTION INTRAVENOUS; SUBCUTANEOUS at 05:21

## 2023-06-25 RX ADMIN — HEPARIN SODIUM 7500 UNIT(S): 5000 INJECTION INTRAVENOUS; SUBCUTANEOUS at 11:57

## 2023-06-25 RX ADMIN — Medication 1 PACKET(S): at 10:28

## 2023-06-25 RX ADMIN — ONDANSETRON 4 MILLIGRAM(S): 8 TABLET, FILM COATED ORAL at 00:12

## 2023-06-25 NOTE — DISCHARGE NOTE PROVIDER - NSDCCPCAREPLAN_GEN_ALL_CORE_FT
PRINCIPAL DISCHARGE DIAGNOSIS  Diagnosis: Ventral incisional hernia  Assessment and Plan of Treatment:

## 2023-06-25 NOTE — DISCHARGE NOTE PROVIDER - NSDCFUADDAPPT_GEN_ALL_CORE_FT
Please follow up with Dr. Cruz; you may call the office at [phone] to schedule your appointment.   Please follow up with Dr. Cruz; you may call the office at [phone] to schedule your appointment.    Please follow up with your cardiologist as soon as possible for continued management.

## 2023-06-25 NOTE — DISCHARGE NOTE PROVIDER - HOSPITAL COURSE
The patient is a 72 year old male with a PMHx of asxs PVCs, BPH, diverticulitis s/p Emery and reversal (~5 years ago in [place]) c/b ventral hernia s/p VHR who now presents for incisional hernia repair with component separation. His postoperative course has been complicated by urinary retention requiring giles catheter placement. On ____, giles catheter was removed and he passed his trial of void. The rest of his post operative course was uncomplicated with tolerating a PO diet, return of bowel function, and pain control.     **INCOMPLETE** The patient is a 72 year old male with a PMHx of asxs PVCs, BPH, diverticulitis s/p Emery and reversal (~5 years ago in [place]) c/b ventral hernia s/p VHR who now presents for incisional hernia repair with component separation. His postoperative course has been complicated by urinary retention requiring giles catheter placement. On 6/26, giles catheter was removed and he passed his trial of void. The rest of his post operative course was uncomplicated with tolerating a PO diet, return of bowel function, and pain control.

## 2023-06-25 NOTE — PROGRESS NOTE ADULT - SUBJECTIVE AND OBJECTIVE BOX
Interventional Cardiology Adult Progress Note    Subjective Assessment: Denies any chest pain or dyspnea. No palpitations   	  MEDICATIONS:  metoprolol succinate ER 25 milliGRAM(s) Oral daily        ondansetron Injectable 4 milliGRAM(s) IV Push every 8 hours PRN  oxycodone    5 mG/acetaminophen 325 mG 1 Tablet(s) Oral every 6 hours PRN        heparin   Injectable 7500 Unit(s) SubCutaneous every 8 hours  tamsulosin 0.4 milliGRAM(s) Oral at bedtime      	    [PHYSICAL EXAM:  TELEMETRY:  T(C): 36.8 (06-25-23 @ 09:11), Max: 37.7 (06-24-23 @ 14:00)  HR: 60 (06-25-23 @ 08:35) (56 - 60)  BP: 126/66 (06-25-23 @ 08:35) (115/62 - 131/75)  RR: 17 (06-25-23 @ 08:35) (17 - 18)  SpO2: 92% (06-25-23 @ 08:35) (92% - 99%)  Wt(kg): --  I&O's Summary    24 Jun 2023 07:01  -  25 Jun 2023 07:00  --------------------------------------------------------  IN: 700 mL / OUT: 1635 mL / NET: -935 mL    25 Jun 2023 07:01  -  25 Jun 2023 09:21  --------------------------------------------------------  IN: 0 mL / OUT: 210 mL / NET: -210 mL        Desai:  Central/PICC/Mid Line:                                         Appearance: Normal	  HEENT:   Normal oral mucosa, PERRL, EOMI	  Neck: Supple, - JVD; Carotid Bruit   Cardiovascular: Normal S1 S2, No JVD, No murmurs,   Respiratory: Lungs clear to auscultation/Decreased Breath Sounds/No Rales, Rhonchi, Wheezing	  	    ECG:  	  RADIOLOGY:   DIAGNOSTIC TESTING:  [ ] Echocardiogram:  [ ]  Catheterization:  [ ] Stress Test:    [ ] WILLY  OTHER: 	    LABS:	 	  CARDIAC MARKERS:                                  10.9   7.12  )-----------( 184      ( 25 Jun 2023 08:32 )             34.9     06-24    134<L>  |  101  |  15  ----------------------------<  121<H>  4.2   |  27  |  0.90    Ca    7.7<L>      24 Jun 2023 05:30  Phos  2.2     06-24  Mg     2.0     06-24    TPro  x   /  Alb  x   /  TBili  0.6  /  DBili  x   /  AST  x   /  ALT  x   /  AlkPhos  x   06-24    proBNP:   Lipid Profile:   HgA1c:   TSH:   PT/INR - ( 24 Jun 2023 05:30 )   PT: 13.6 sec;   INR: 1.14

## 2023-06-25 NOTE — DISCHARGE NOTE PROVIDER - CARE PROVIDER_API CALL
Oscar Cruz  Surgery  [facility] [address] [place] [address], [address]  [place] [address]  Phone: [phone]  Fax: [phone]  Follow Up Time:

## 2023-06-25 NOTE — PROGRESS NOTE ADULT - SUBJECTIVE AND OBJECTIVE BOX
HPI: HPI:  71 YO M with PMH Of PVCs Obesity presented to [facility] for Open repair of recurrent incisional hernia of anterior abdominal wall using synthetic mesh and posterior component separation technique. patient seen post op doing well without complaints of palpitations or chets pain, patient to be monitored on tele for PVS on rhythm , tachycardia now controlled. pt has f/u with cardiology for PVC evaluation in clinic     patient seen and examined at bedside    feeling well without complaints  PVCs persistent on tele, cardiology consulted   outpt cardio f/u     PAST MEDICAL & SURGICAL HISTORY:  H/O ventral hernia      History of palpitations      H/O umbilical hernia repair      History of diverticulitis  COLON RESECTION          Allergies    No Known Allergies    Intolerances        MEDICATIONS  (STANDING):  acetaminophen   IVPB .. 1000 milliGRAM(s) IV Intermittent once  lactated ringers. 1000 milliLiter(s) (70 mL/Hr) IV Continuous <Continuous>  tamsulosin 0.4 milliGRAM(s) Oral at bedtime    MEDICATIONS  (PRN):  ondansetron Injectable 4 milliGRAM(s) IV Push every 8 hours PRN Nausea and/or Vomiting  oxycodone    5 mG/acetaminophen 325 mG 1 Tablet(s) Oral every 6 hours PRN Moderate Pain (4 - 6)      SOCIAL HISTORY:    FAMILY HISTORY:        Vital Signs Last 24 Hrs  T(C): 37.1 (22 Jun 2023 15:00), Max: 37.1 (22 Jun 2023 15:00)  T(F): 98.7 (22 Jun 2023 15:00), Max: 98.7 (22 Jun 2023 15:00)  HR: 80 (22 Jun 2023 15:50) (56 - 80)  BP: 125/85 (22 Jun 2023 15:50) (108/68 - 125/85)  BP(mean): 98 (22 Jun 2023 15:50) (82 - 98)  RR: 18 (22 Jun 2023 15:50) (7 - 26)  SpO2: 97% (22 Jun 2023 15:50) (94% - 100%)    Parameters below as of 22 Jun 2023 15:50  Patient On (Oxygen Delivery Method): nasal cannula  O2 Flow (L/min): 2      I&O's Summary    22 Jun 2023 07:01  -  22 Jun 2023 16:17  --------------------------------------------------------  IN: 420 mL / OUT: 578 mL / NET: -158 mL        LABS:                CAPILLARY BLOOD GLUCOSE      POCT Blood Glucose.: 101 mg/dL (22 Jun 2023 06:37)      Cultures:      PHYSICAL EXAM:  General: NAD, resting comfortably  HEENT: NC/AT, EOMI, normal hearing, no oral lesions, no LAD, neck supple  Pulmonary: Normal resp effort, CTA-B  Cardiovascular: NSR, no murmurs  Abdominal: Soft, mild tenderness dressings CDI / , no organomegaly  Groin: Soft, nontender, no ecchymosis/hematoma, no erythema, no edema.  Extremities: (+) DP/PT pulses. FROM, normal strength, no clubbing/cyanosis/erythema/edema  Neuro: A/O x 3, CNs II-XII grossly intact, normal sensation, no focal deficits  Pulses: Palpable distal pulses    RADIOLOGY & ADDITIONAL STUDIES:      ASSESSMENT:      PLAN:

## 2023-06-25 NOTE — DISCHARGE NOTE PROVIDER - ATTENDING DISCHARGE PHYSICAL EXAMINATION:
Pt is doing well. No complaints  Abdomen soft, nondistended, non tender, incisions are c/d/i.  PLan for follow up in office in 1-2 weeks. No heavy lifting. Wear abdominal binder, take IS home and use daily.

## 2023-06-25 NOTE — DISCHARGE NOTE PROVIDER - NSDCMRMEDTOKEN_GEN_ALL_CORE_FT
metoprolol succinate 25 mg oral capsule, extended release: 1 orally once a day  tamsulosin 0.4 mg oral capsule: 1 orally once a day (at bedtime)   metoprolol succinate 25 mg oral capsule, extended release: 1 orally once a day  oxyCODONE 5 mg oral tablet: 1 tab(s) orally every 8 hours as needed for -for severe pain MDD: 3  polyethylene glycol 3350 oral powder for reconstitution: 17 gram(s) orally once a day  tamsulosin 0.4 mg oral capsule: 1 orally once a day (at bedtime)

## 2023-06-25 NOTE — DISCHARGE NOTE PROVIDER - NSDCCPTREATMENT_GEN_ALL_CORE_FT
PRINCIPAL PROCEDURE  Procedure: Open repair of recurrent incisional hernia of anterior abdominal wall using synthetic mesh and posterior component separation technique  Findings and Treatment:

## 2023-06-25 NOTE — DISCHARGE NOTE PROVIDER - NSDCFUADDINST_GEN_ALL_CORE_FT
For pain, you may 650mg of Tylenol every 6 hours as needed. Please do not exceed 4000mg of Tylenol in 24 hours.  For pain that is not resolved with over the counter medication, you may take 1 oxycodone 5mg tablet every 8 hours as needed.  Please take 2 tablets of colace each night if you are taking opioid medication for pain.    General Discharge Instructions:  Please resume all regular home medications unless specifically advised not to take a particular medication. Please take any new medications as prescribed.  Please get plenty of rest, continue to ambulate several times per day, and drink adequate amounts of fluids. Avoid lifting weights greater than 5-10 lbs until you follow-up with your surgeon, who will instruct you further regarding activity restrictions.  Avoid driving or operating heavy machinery while taking pain medications.  Please follow-up with your surgeon and Primary Care Provider (PCP) as advised.    Warning Signs:  Please call your doctor or nurse practitioner if you experience the following:  *You experience new chest pain, pressure, squeezing or tightness.  *New or worsening cough, shortness of breath, or wheeze.  *If you are vomiting and cannot keep down fluids or your medications.  *You are getting dehydrated due to continued vomiting, diarrhea, or other reasons. Signs of dehydration include dry mouth, rapid heartbeat, or feeling dizzy or faint when standing.  *You see blood or dark/black material when you vomit or have a bowel movement.  *You experience burning when you urinate, have blood in your urine, or experience a discharge.  *Your pain is not improving within 8-12 hours or is not gone within 24 hours. Call or return immediately if your pain is getting worse, changes location, or moves to your chest or back.  *You have shaking chills, or fever greater than 101.5 degrees Fahrenheit or 38 degrees Celsius.  *Any change in your symptoms, or any new symptoms that concern you.   For pain, you may 650mg of Tylenol every 6 hours as needed. Please do not exceed 4000mg of Tylenol in 24 hours.  For pain that is not resolved with over the counter medication, you may take 1 oxycodone 5mg tablet every 8 hours as needed.  Please take 1 serving of Miralax (17g) every day if you are taking oxycodone, to prevent constipation.     General Discharge Instructions:  Please resume all regular home medications unless specifically advised not to take a particular medication. Please take any new medications as prescribed.  Please get plenty of rest, continue to ambulate several times per day, and drink adequate amounts of fluids. Avoid lifting weights greater than 5-10 lbs until you follow-up with your surgeon, who will instruct you further regarding activity restrictions.  Avoid driving or operating heavy machinery while taking pain medications.  Please follow-up with your surgeon and Primary Care Provider (PCP) as advised.    Warning Signs:  Please call your doctor or nurse practitioner if you experience the following:  *You experience new chest pain, pressure, squeezing or tightness.  *New or worsening cough, shortness of breath, or wheeze.  *If you are vomiting and cannot keep down fluids or your medications.  *You are getting dehydrated due to continued vomiting, diarrhea, or other reasons. Signs of dehydration include dry mouth, rapid heartbeat, or feeling dizzy or faint when standing.  *You see blood or dark/black material when you vomit or have a bowel movement.  *You experience burning when you urinate, have blood in your urine, or experience a discharge.  *Your pain is not improving within 8-12 hours or is not gone within 24 hours. Call or return immediately if your pain is getting worse, changes location, or moves to your chest or back.  *You have shaking chills, or fever greater than 101.5 degrees Fahrenheit or 38 degrees Celsius.  *Any change in your symptoms, or any new symptoms that concern you.

## 2023-06-26 VITALS — TEMPERATURE: 99 F

## 2023-06-26 LAB
ANION GAP SERPL CALC-SCNC: 8 MMOL/L — SIGNIFICANT CHANGE UP (ref 5–17)
BUN SERPL-MCNC: 17 MG/DL — SIGNIFICANT CHANGE UP (ref 7–23)
CALCIUM SERPL-MCNC: 8.5 MG/DL — SIGNIFICANT CHANGE UP (ref 8.4–10.5)
CHLORIDE SERPL-SCNC: 101 MMOL/L — SIGNIFICANT CHANGE UP (ref 96–108)
CO2 SERPL-SCNC: 27 MMOL/L — SIGNIFICANT CHANGE UP (ref 22–31)
CREAT SERPL-MCNC: 0.8 MG/DL — SIGNIFICANT CHANGE UP (ref 0.5–1.3)
EGFR: 93 ML/MIN/1.73M2 — SIGNIFICANT CHANGE UP
GLUCOSE SERPL-MCNC: 100 MG/DL — HIGH (ref 70–99)
HCT VFR BLD CALC: 36.6 % — LOW (ref 39–50)
HGB BLD-MCNC: 11.6 G/DL — LOW (ref 13–17)
MAGNESIUM SERPL-MCNC: 1.9 MG/DL — SIGNIFICANT CHANGE UP (ref 1.6–2.6)
MCHC RBC-ENTMCNC: 26.8 PG — LOW (ref 27–34)
MCHC RBC-ENTMCNC: 31.7 GM/DL — LOW (ref 32–36)
MCV RBC AUTO: 84.5 FL — SIGNIFICANT CHANGE UP (ref 80–100)
NRBC # BLD: 0 /100 WBCS — SIGNIFICANT CHANGE UP (ref 0–0)
PHOSPHATE SERPL-MCNC: 2.1 MG/DL — LOW (ref 2.5–4.5)
PLATELET # BLD AUTO: 207 K/UL — SIGNIFICANT CHANGE UP (ref 150–400)
POTASSIUM SERPL-MCNC: 3.9 MMOL/L — SIGNIFICANT CHANGE UP (ref 3.5–5.3)
POTASSIUM SERPL-SCNC: 3.9 MMOL/L — SIGNIFICANT CHANGE UP (ref 3.5–5.3)
RBC # BLD: 4.33 M/UL — SIGNIFICANT CHANGE UP (ref 4.2–5.8)
RBC # FLD: 15.1 % — HIGH (ref 10.3–14.5)
SODIUM SERPL-SCNC: 136 MMOL/L — SIGNIFICANT CHANGE UP (ref 135–145)
WBC # BLD: 6.27 K/UL — SIGNIFICANT CHANGE UP (ref 3.8–10.5)
WBC # FLD AUTO: 6.27 K/UL — SIGNIFICANT CHANGE UP (ref 3.8–10.5)

## 2023-06-26 PROCEDURE — C1781: CPT

## 2023-06-26 PROCEDURE — 82962 GLUCOSE BLOOD TEST: CPT

## 2023-06-26 PROCEDURE — 97161 PT EVAL LOW COMPLEX 20 MIN: CPT

## 2023-06-26 PROCEDURE — 83735 ASSAY OF MAGNESIUM: CPT

## 2023-06-26 PROCEDURE — 36415 COLL VENOUS BLD VENIPUNCTURE: CPT

## 2023-06-26 PROCEDURE — 84100 ASSAY OF PHOSPHORUS: CPT

## 2023-06-26 PROCEDURE — 97530 THERAPEUTIC ACTIVITIES: CPT

## 2023-06-26 PROCEDURE — 85027 COMPLETE CBC AUTOMATED: CPT

## 2023-06-26 PROCEDURE — 88302 TISSUE EXAM BY PATHOLOGIST: CPT

## 2023-06-26 PROCEDURE — 80048 BASIC METABOLIC PNL TOTAL CA: CPT

## 2023-06-26 PROCEDURE — 97116 GAIT TRAINING THERAPY: CPT

## 2023-06-26 PROCEDURE — 86803 HEPATITIS C AB TEST: CPT

## 2023-06-26 RX ORDER — OXYCODONE HYDROCHLORIDE 5 MG/1
1 TABLET ORAL
Qty: 5 | Refills: 0
Start: 2023-06-26

## 2023-06-26 RX ORDER — POLYETHYLENE GLYCOL 3350 17 G/17G
17 POWDER, FOR SOLUTION ORAL
Qty: 1 | Refills: 0
Start: 2023-06-26 | End: 2023-07-09

## 2023-06-26 RX ORDER — POLYETHYLENE GLYCOL 3350 17 G/17G
17 POWDER, FOR SOLUTION ORAL ONCE
Refills: 0 | Status: COMPLETED | OUTPATIENT
Start: 2023-06-26 | End: 2023-06-26

## 2023-06-26 RX ADMIN — POLYETHYLENE GLYCOL 3350 17 GRAM(S): 17 POWDER, FOR SOLUTION ORAL at 07:26

## 2023-06-26 RX ADMIN — HEPARIN SODIUM 7500 UNIT(S): 5000 INJECTION INTRAVENOUS; SUBCUTANEOUS at 04:06

## 2023-06-26 RX ADMIN — HEPARIN SODIUM 7500 UNIT(S): 5000 INJECTION INTRAVENOUS; SUBCUTANEOUS at 12:01

## 2023-06-26 NOTE — DISCHARGE NOTE NURSING/CASE MANAGEMENT/SOCIAL WORK - PATIENT PORTAL LINK FT
You can access the FollowMyHealth Patient Portal offered by [facility] by registering at the following website: [email or web address]. By joining [employer]’s FollowMyHealth portal, you will also be able to view your health information using other applications (apps) compatible with our system.

## 2023-06-26 NOTE — PROGRESS NOTE ADULT - REASON FOR ADMISSION
Elective procedure
abdominal pain

## 2023-06-26 NOTE — DISCHARGE NOTE NURSING/CASE MANAGEMENT/SOCIAL WORK - NSDCPEFALRISK_GEN_ALL_CORE
For information on Fall & Injury Prevention, visit: [email or web address] OR  [email or web address] OR  https://www.cdc.gov/steadi/patient.html

## 2023-06-26 NOTE — PROGRESS NOTE ADULT - SUBJECTIVE AND OBJECTIVE BOX
HPI: HPI:  73 YO M with PMH Of PVCs Obesity presented to [facility] for Open repair of recurrent incisional hernia of anterior abdominal wall using synthetic mesh and posterior component separation technique. patient seen post op doing well without complaints of palpitations or chets pain, patient to be monitored on tele for PVS on rhythm , tachycardia now controlled. pt has f/u with cardiology for PVC evaluation in clinic     patient seen and examined at bedside    feeling well without complaints  PVCs persistent on tele, cardiology consulted   outpt cardio f/u     PAST MEDICAL & SURGICAL HISTORY:  H/O ventral hernia      History of palpitations      H/O umbilical hernia repair      History of diverticulitis  COLON RESECTION          Allergies    No Known Allergies    Intolerances        MEDICATIONS  (STANDING):  acetaminophen   IVPB .. 1000 milliGRAM(s) IV Intermittent once  lactated ringers. 1000 milliLiter(s) (70 mL/Hr) IV Continuous <Continuous>  tamsulosin 0.4 milliGRAM(s) Oral at bedtime    MEDICATIONS  (PRN):  ondansetron Injectable 4 milliGRAM(s) IV Push every 8 hours PRN Nausea and/or Vomiting  oxycodone    5 mG/acetaminophen 325 mG 1 Tablet(s) Oral every 6 hours PRN Moderate Pain (4 - 6)      SOCIAL HISTORY:    FAMILY HISTORY:        Vital Signs Last 24 Hrs  T(C): 37.1 (22 Jun 2023 15:00), Max: 37.1 (22 Jun 2023 15:00)  T(F): 98.7 (22 Jun 2023 15:00), Max: 98.7 (22 Jun 2023 15:00)  HR: 80 (22 Jun 2023 15:50) (56 - 80)  BP: 125/85 (22 Jun 2023 15:50) (108/68 - 125/85)  BP(mean): 98 (22 Jun 2023 15:50) (82 - 98)  RR: 18 (22 Jun 2023 15:50) (7 - 26)  SpO2: 97% (22 Jun 2023 15:50) (94% - 100%)    Parameters below as of 22 Jun 2023 15:50  Patient On (Oxygen Delivery Method): nasal cannula  O2 Flow (L/min): 2      I&O's Summary    22 Jun 2023 07:01  -  22 Jun 2023 16:17  --------------------------------------------------------  IN: 420 mL / OUT: 578 mL / NET: -158 mL        LABS:                CAPILLARY BLOOD GLUCOSE      POCT Blood Glucose.: 101 mg/dL (22 Jun 2023 06:37)      Cultures:      PHYSICAL EXAM:  General: NAD, resting comfortably  HEENT: NC/AT, EOMI, normal hearing, no oral lesions, no LAD, neck supple  Pulmonary: Normal resp effort, CTA-B  Cardiovascular: NSR, no murmurs  Abdominal: Soft, mild tenderness dressings CDI / , no organomegaly  Groin: Soft, nontender, no ecchymosis/hematoma, no erythema, no edema.  Extremities: (+) DP/PT pulses. FROM, normal strength, no clubbing/cyanosis/erythema/edema  Neuro: A/O x 3, CNs II-XII grossly intact, normal sensation, no focal deficits  Pulses: Palpable distal pulses    RADIOLOGY & ADDITIONAL STUDIES:      ASSESSMENT:      PLAN:

## 2023-06-26 NOTE — PROGRESS NOTE ADULT - ASSESSMENT
72M with pmh of asxs PVCs, BPH, diverticulitis s/p bocanegra and reversal (~5 years ago in [place]) c/b ventral hernia s/p VHR who presents for incisional hernia repair with component separation.     CLD/IVF  Pain/nausea control prn  RAS to bulb suction  SQH/SCDs/OOBA/IS  AM Labs  
72M PMH PVCs, diverticulitis s/p Emery procedure s/p hernia repair,   Stable CV status   has frequent PVCs  Continue home Toprol  Outpatient Cardiology follow-up
72M PMH PVCs, diverticulitis s/p Emery procedure s/p hernia repair,   Stable CV status   has frequent PVCs  Continue home Toprol  Outpatient Cardiology follow-up
72M with pmh of asxs PVCs, BPH, diverticulitis s/p bocanegra and reversal (~5 years ago in [place]) c/b ventral hernia s/p VHR who presents for incisional hernia repair with component separation.     reg diet  Pain/nausea control prn  SQH/SCDs/OOBA/IS  Bowel Reg  RAS to bulb suction  AM Labs  dispo: HomePT
I:  71 YO M with PMH Of PVCs Obesity presented to [facility] for Open repair of recurrent incisional hernia of anterior abdominal wall using synthetic mesh and posterior component separation technique. patient seen post op doing well without complaints of palpitations or chets pain, patient to be monitored on tele for PVS on rhythm , tachycardia now controlled. pt has f/u with cardiology for PVC evaluation in clinic     BPH- cont tamsulosin   PVCs - monitor on tele, beta blockers for rate control   recent stress/ TTE in chart preserved EF, TWIs on previous EKGs as well as tele  cardiology consulted   SCDs / DVT ppx as per surgery / pain management     thanks for this consult !!!
I:  73 YO M with PMH Of PVCs Obesity presented to [facility] for Open repair of recurrent incisional hernia of anterior abdominal wall using synthetic mesh and posterior component separation technique. patient seen post op doing well without complaints of palpitations or chets pain, patient to be monitored on tele for PVS on rhythm , tachycardia now controlled. pt has f/u with cardiology for PVC evaluation in clinic     BPH- cont tamsulosin   PVCs - monitor on tele, beta blockers for rate control   recent stress/ TTE in chart preserved EF, TWIs on previous EKGs as well as tele  cardiology consulted   SCDs / DVT ppx as per surgery / pain management     thanks for this consult !!!
I:  73 YO M with PMH Of PVCs Obesity presented to [facility] for Open repair of recurrent incisional hernia of anterior abdominal wall using synthetic mesh and posterior component separation technique. patient seen post op doing well without complaints of palpitations or chets pain, patient to be monitored on tele for PVS on rhythm , tachycardia now controlled. pt has f/u with cardiology for PVC evaluation in clinic     BPH- cont tamsulosin   PVCs - monitor on tele, beta blockers for rate control   recent stress/ TTE in chart preserved EF, TWIs on previous EKGs as well as tele  cardiology consulted   SCDs / DVT ppx as per surgery / pain management     thanks for this consult !!!
I:  73 YO M with PMH Of PVCs Obesity presented to [facility] for Open repair of recurrent incisional hernia of anterior abdominal wall using synthetic mesh and posterior component separation technique. patient seen post op doing well without complaints of palpitations or chets pain, patient to be monitored on tele for PVS on rhythm , tachycardia now controlled. pt has f/u with cardiology for PVC evaluation in clinic     BPH- cont tamsulosin   PVCs - monitor on tele, beta blockers for rate control   recent stress/ TTE in chart preserved EF, TWIs on previous EKGs as well as tele  cardiology consulted   SCDs / DVT ppx as per surgery / pain management     thanks for this consult !!!
72M with pmh of asxs PVCs, BPH, diverticulitis s/p bocanegra and reversal (~5 years ago in [place]) c/b ventral hernia s/p VHR who presents for incisional hernia repair with component separation.     reg diet  Pain/nausea control prn  SQH/SCDs/OOBA/IS  RAS to bulb suction  AM Labs  Desai  dispo: home PT

## 2023-06-26 NOTE — PROGRESS NOTE ADULT - SUBJECTIVE AND OBJECTIVE BOX
STATUS POST: Ventral Hernia Repair    POST OPERATIVE DAY #: 4    SUBJECTIVE: Pt seen and examined at bedside this am by surgery team. Patient is lying comfortably in bed with no complaints. Tolerating diet, pain well controlled with current regimen. Patient denies fever, nausea, vomiting, chest pain, and shortness of breath. Patient is passing gas. Passed TOV.    MEDICATIONS  (STANDING):  heparin   Injectable 7500 Unit(s) SubCutaneous every 8 hours  metoprolol succinate ER 25 milliGRAM(s) Oral daily  tamsulosin 0.4 milliGRAM(s) Oral at bedtime    MEDICATIONS  (PRN):  ondansetron Injectable 4 milliGRAM(s) IV Push every 8 hours PRN Nausea and/or Vomiting  oxycodone    5 mG/acetaminophen 325 mG 1 Tablet(s) Oral every 6 hours PRN Moderate Pain (4 - 6)      Vital Signs Last 24 Hrs  T(C): 37.2 (26 Jun 2023 04:46), Max: 37.6 (25 Jun 2023 22:20)  T(F): 99 (26 Jun 2023 04:46), Max: 99.6 (25 Jun 2023 22:20)  HR: 56 (26 Jun 2023 06:03) (56 - 88)  BP: 111/71 (26 Jun 2023 06:03) (93/54 - 143/89)  BP(mean): 86 (26 Jun 2023 06:03) (68 - 110)  RR: 18 (26 Jun 2023 06:03) (17 - 18)  SpO2: 90% (26 Jun 2023 06:03) (89% - 94%)    Parameters below as of 26 Jun 2023 06:03  Patient On (Oxygen Delivery Method): room air        Physical Exam  General: Patient is doing well and lying in bed comfortably  Constitutional: alert and oriented   Pulm: Nonlabored breathing, no respiratory distress  CV: Regular rate and rhythm, normal sinus rhythm  Abd:  soft, nontender, nondistended. No rebound, no guarding.             Incisions: clean, dry, intact and healing well  Extremities: warm, well perfused, no edema  Drains: RAS x 1 ss    I&O's Detail    24 Jun 2023 07:01  -  25 Jun 2023 07:00  --------------------------------------------------------  IN:    Lactated Ringers: 700 mL  Total IN: 700 mL    OUT:    Bulb (mL): 50 mL    Indwelling Catheter - Urethral (mL): 1585 mL  Total OUT: 1635 mL    Total NET: -935 mL      25 Jun 2023 07:01  -  26 Jun 2023 06:51  --------------------------------------------------------  IN:  Total IN: 0 mL    OUT:    Bulb (mL): 50 mL    Indwelling Catheter - Urethral (mL): 500 mL    Voided (mL): 1450 mL  Total OUT: 2000 mL    Total NET: -2000 mL        LABS:                        10.9   7.12  )-----------( 184      ( 25 Jun 2023 08:32 )             34.9     06-25    137  |  103  |  17  ----------------------------<  100<H>  3.9   |  26  |  0.81    Ca    8.1<L>      25 Jun 2023 08:32  Phos  2.2     06-25  Mg     2.0     06-25        Urinalysis Basic - ( 25 Jun 2023 08:32 )    Color: x / Appearance: x / SG: x / pH: x  Gluc: 100 mg/dL / Ketone: x  / Bili: x / Urobili: x   Blood: x / Protein: x / Nitrite: x   Leuk Esterase: x / RBC: x / WBC x   Sq Epi: x / Non Sq Epi: x / Bacteria: x

## 2023-07-01 ENCOUNTER — EMERGENCY (EMERGENCY)
Facility: HOSPITAL | Age: 73
LOS: 1 days | Discharge: HOME | End: 2023-07-01
Attending: EMERGENCY MEDICINE | Admitting: EMERGENCY MEDICINE
Payer: COMMERCIAL

## 2023-07-01 VITALS
HEIGHT: 63 IN | SYSTOLIC BLOOD PRESSURE: 123 MMHG | RESPIRATION RATE: 17 BRPM | OXYGEN SATURATION: 96 % | DIASTOLIC BLOOD PRESSURE: 86 MMHG | HEART RATE: 88 BPM | WEIGHT: 16.98 LBS | TEMPERATURE: 98 F

## 2023-07-01 DIAGNOSIS — Z98.890 OTHER SPECIFIED POSTPROCEDURAL STATES: Chronic | ICD-10-CM

## 2023-07-01 DIAGNOSIS — T81.31XA DISRUPTION OF EXTERNAL OPERATION (SURGICAL) WOUND, NOT ELSEWHERE CLASSIFIED, INITIAL ENCOUNTER: ICD-10-CM

## 2023-07-01 DIAGNOSIS — Z87.19 PERSONAL HISTORY OF OTHER DISEASES OF THE DIGESTIVE SYSTEM: ICD-10-CM

## 2023-07-01 DIAGNOSIS — Z87.438 PERSONAL HISTORY OF OTHER DISEASES OF MALE GENITAL ORGANS: ICD-10-CM

## 2023-07-01 DIAGNOSIS — Z93.3 COLOSTOMY STATUS: Chronic | ICD-10-CM

## 2023-07-01 DIAGNOSIS — Z90.49 ACQUIRED ABSENCE OF OTHER SPECIFIED PARTS OF DIGESTIVE TRACT: ICD-10-CM

## 2023-07-01 DIAGNOSIS — Z86.79 PERSONAL HISTORY OF OTHER DISEASES OF THE CIRCULATORY SYSTEM: ICD-10-CM

## 2023-07-01 DIAGNOSIS — Z87.19 PERSONAL HISTORY OF OTHER DISEASES OF THE DIGESTIVE SYSTEM: Chronic | ICD-10-CM

## 2023-07-01 PROBLEM — N40.0 BENIGN PROSTATIC HYPERPLASIA WITHOUT LOWER URINARY TRACT SYMPTOMS: Chronic | Status: ACTIVE | Noted: 2023-06-22

## 2023-07-01 PROBLEM — Z87.898 PERSONAL HISTORY OF OTHER SPECIFIED CONDITIONS: Chronic | Status: ACTIVE | Noted: 2023-06-21

## 2023-07-01 PROCEDURE — 99284 EMERGENCY DEPT VISIT MOD MDM: CPT

## 2023-07-01 PROCEDURE — 99282 EMERGENCY DEPT VISIT SF MDM: CPT

## 2023-07-01 NOTE — CHART NOTE - NSCHARTNOTEFT_GEN_A_CORE
72M with pmh of asxs PVCs, BPH, diverticulitis s/p bocanegra and reversal (~5 years ago in [place]) c/b ventral hernia s/p VHR who presented to [facility] for incisional hernia repair with component separation. Pt discharged from [facility] 7/26.     Pt presented to ED today for evaluation of wound and skin separation. Pt states that he had no pain, no bleeding but was concerned with the appearance of the incision.   Pt VSS, HD stable.     No labs drawn.   No imaging performed.     On examination of the incision, there was black eschar on lower third which showed possible separation of skin. No erythema, no induration, no discharge.     Performed wound care procedure to optimize healing of incision.   Procedure performed in sterile fashion at bedside.   Injected 1% lidocaine surrounding incision. Debrided black eschar from incision. Placed 3-O Ethilon vertical mattress suture for primary closure of skin. Placed staples in addition to sutures. Upon further examination, the dermabond dressing was drying out on upper half of incision. Removed dermabond dressing and placed staples to optimize closure of skin. Wound cleaned. Paper tape + gauze dressing placed over incision.     Pt is a 72M s/p incisional hernia repair w/ component separation who presented to ED today for wound care/wound healing optimization. Closure of skin on incision was performed in sterile fashion. Abdominal binder was placed.     Plan:   - pt cleared for dc from ED and to f/u with Dr. Cruz in clinic in 1-week

## 2023-07-01 NOTE — ED PROVIDER NOTE - OBJECTIVE STATEMENT
72M with pmh of asxs PVCs, BPH, diverticulitis s/p bocanegra and reversal (~5 years ago in [place]) c/b ventral hernia s/p VHR who presented to [facility] for incisional hernia repair, coming in for wound check

## 2023-07-01 NOTE — ED PROVIDER NOTE - PHYSICAL EXAMINATION
CONSTITUTIONAL: Well-appearing; well-nourished; in no apparent distress.   HEAD: Normocephalic; atraumatic.   EYES:  conjunctiva and sclera clear  ENT: normal nose; no rhinorrhea;  NECK: Supple; full ROM  RESPIRATORY: Breathing easily; no resp difficulty  EXT: No cyanosis or edema;  GI: soft, non tender. healing midline incision with mild dehiscence of wound at inferior margin  SKIN: Normal for age and race; warm; dry; good turgor; no apparent lesions or rash.   NEURO: A & O x 3; face symmetric; grossly unremarkable.   PSYCHOLOGICAL: The patient’s mood and manner are appropriate.

## 2023-07-01 NOTE — ED ADULT NURSE NOTE - NSFALLUNIVINTERV_ED_ALL_ED
19.09 Bed/Stretcher in lowest position, wheels locked, appropriate side rails in place/Call bell, personal items and telephone in reach/Instruct patient to call for assistance before getting out of bed/chair/stretcher/Non-slip footwear applied when patient is off stretcher/[place] to call system/Physically safe environment - no spills, clutter or unnecessary equipment/Purposeful proactive rounding/Room/bathroom lighting operational, light cord in reach

## 2023-07-01 NOTE — ED ADULT TRIAGE NOTE - CHIEF COMPLAINT QUOTE
"I had surgery 2 weeks ago and it opened so surgeon told me to come to ER". Surgeon met patient in triage

## 2023-07-01 NOTE — ED PROVIDER NOTE - PATIENT PORTAL LINK FT
You can access the FollowMyHealth Patient Portal offered by [facility] by registering at the following website: [email or web address]. By joining [employer]’s FollowMyHealth portal, you will also be able to view your health information using other applications (apps) compatible with our system.

## 2023-07-01 NOTE — ED ADULT NURSE NOTE - OBJECTIVE STATEMENT
Pt presents to ED with post op complication. Pt had recent surgery 2 weeks ago on abdomen and the wound opened up. Pt told to come in by surgeon. Surgeon met pt in triage and is at bedside to close wound site. Pt resting comfortably on stretcher. No active bleeding at this time.

## 2023-07-03 DIAGNOSIS — E66.9 OBESITY, UNSPECIFIED: ICD-10-CM

## 2023-07-03 DIAGNOSIS — K43.0 INCISIONAL HERNIA WITH OBSTRUCTION, WITHOUT GANGRENE: ICD-10-CM

## 2023-07-03 DIAGNOSIS — N40.0 BENIGN PROSTATIC HYPERPLASIA WITHOUT LOWER URINARY TRACT SYMPTOMS: ICD-10-CM

## 2023-07-03 DIAGNOSIS — I49.3 VENTRICULAR PREMATURE DEPOLARIZATION: ICD-10-CM

## 2023-07-03 DIAGNOSIS — M62.08 SEPARATION OF MUSCLE (NONTRAUMATIC), OTHER SITE: ICD-10-CM

## 2023-07-03 DIAGNOSIS — K66.0 PERITONEAL ADHESIONS (POSTPROCEDURAL) (POSTINFECTION): ICD-10-CM

## 2023-07-10 LAB — SURGICAL PATHOLOGY STUDY: SIGNIFICANT CHANGE UP

## 2023-10-16 NOTE — PATIENT PROFILE ADULT - NSPROGENPREVTRANSF_GEN_A_NUR
no history of blood product transfusion
Attending and PA/NP shared services statement (NON-critical care):

## 2024-04-02 ENCOUNTER — OFFICE VISIT (OUTPATIENT)
Dept: DENTISTRY | Facility: CLINIC | Age: 74
End: 2024-04-02

## 2024-04-02 VITALS — HEART RATE: 54 BPM | DIASTOLIC BLOOD PRESSURE: 77 MMHG | SYSTOLIC BLOOD PRESSURE: 132 MMHG

## 2024-04-02 DIAGNOSIS — K08.51: ICD-10-CM

## 2024-04-02 DIAGNOSIS — K04.7 DENTAL ABSCESS: Primary | ICD-10-CM

## 2024-04-02 PROCEDURE — D0330 PANORAMIC RADIOGRAPHIC IMAGE: HCPCS

## 2024-04-02 PROCEDURE — D0140 LIMITED ORAL EVALUATION - PROBLEM FOCUSED: HCPCS

## 2024-04-02 RX ORDER — AMOXICILLIN 500 MG/1
500 CAPSULE ORAL EVERY 8 HOURS SCHEDULED
Qty: 21 CAPSULE | Refills: 0 | Status: SHIPPED | OUTPATIENT
Start: 2024-04-02 | End: 2024-04-09

## 2024-04-02 NOTE — PROGRESS NOTES
"Jus Villagomez presented for limited evaluation  CC: \"My lower left tooth in the back started to hurt a few weeks ago. A few days ago my face on the lower left swelled up. It was hard to sleep last night. Tylenol is not helping\"  Pain: 8/10  Med Hx: Updated and reviewed. ASA2  OE: Left mandibular swelling and calor near the angle of the mandible  IE: red inflamed gingiva around #18  Radiographs: Pano taken and reviewed. Recurrent decay to pulp #18DO with PARL  Tx Details: Clinical exam correlated with radiographic findings. #18 positive to percussion and endo ice. There is swelling and calor EO with redness and positive to palpation IO. Radiographically, #18DO recurrent decay is extensive causing the tooth to be non-restorable. Discussed with pt and pt understood. Informed pt that #18 will need ext. Pt understood. Pt denies allergies to penicillin. Amox send to pt pharmacy. Informed pt to take tylenol and advil together for pain control until abx works. Pt understood. Pt left satisfied and ambulatory.     NV: ext #18  "

## 2024-04-11 ENCOUNTER — OFFICE VISIT (OUTPATIENT)
Dept: DENTISTRY | Facility: CLINIC | Age: 74
End: 2024-04-11

## 2024-04-11 ENCOUNTER — TELEPHONE (OUTPATIENT)
Dept: DENTISTRY | Facility: CLINIC | Age: 74
End: 2024-04-11

## 2024-04-11 VITALS — HEART RATE: 59 BPM | DIASTOLIC BLOOD PRESSURE: 77 MMHG | SYSTOLIC BLOOD PRESSURE: 133 MMHG | TEMPERATURE: 97.3 F

## 2024-04-11 DIAGNOSIS — K02.9 COMPLEX DENTAL CARIES: ICD-10-CM

## 2024-04-11 DIAGNOSIS — Z87.19 HISTORY OF DENTAL ABSCESS: Primary | ICD-10-CM

## 2024-04-11 NOTE — DENTAL PROCEDURE DETAILS
"Limited Oral Evaluation Tooth #18.  Patient (Jus Villagomez) presents for dental visit.   Patient consent treatment.  RMH, no changes. Patient reported history of prostate disease.   ASA: II  Vitals: BP:133/77, pulse 59.   Pain scale: 0  Chief complain: \"tooth extraction\".  HPI: patient reported no pain today. Patient reported he is aware of his tooth #18 needs extraction per treatment plan by other provider. Patient reported he finished the prescribed Amoxicillin medication.   Dental History: Patient was seen on 4/2/2024 by resident Dr. Herminio Nolen and treatment planned for extraction of tooth #18.   Radiograph: current.  Assessment/Plan: tooth #18 extensive deep caries DOBL, gingival inflammation with buccal fistula, mesially tilted, degree II mobility, palpation (+) and percussion (+). Non restorable tooth. Poor prognosis. DX: chronic dental abscess/complex caries. Tooth may need surgical extraction. Informed and discussed with patient possible extraction here or referral to OMS. Patient is concerned about tooth removal and possible complications. Patient opt to be seen by oral surgeon.   Referral: STAT to [facility] for extraction of tooth #18.  Reviewed oral hygiene and need for recall visits.   POI is given. Patient left satisfied and ambulatory.    NV: COMP and FMX.   "

## 2024-05-31 ENCOUNTER — OFFICE VISIT (OUTPATIENT)
Dept: DENTISTRY | Facility: CLINIC | Age: 74
End: 2024-05-31

## 2024-05-31 DIAGNOSIS — K04.5 SYMPTOMATIC PERIAPICAL PERIODONTITIS: Primary | ICD-10-CM

## 2024-05-31 DIAGNOSIS — K04.7 ABSCESS OF APEX OF DENTAL ROOT COMPLICATING CHRONIC INFLAMMATION: ICD-10-CM

## 2024-05-31 PROCEDURE — D0220 INTRAORAL - PERIAPICAL FIRST RADIOGRAPHIC IMAGE: HCPCS | Performed by: DENTIST

## 2024-05-31 PROCEDURE — D0140 LIMITED ORAL EVALUATION - PROBLEM FOCUSED: HCPCS | Performed by: DENTIST

## 2024-05-31 PROCEDURE — D0230 INTRAORAL - PERIAPICAL EACH ADDITIONAL RADIOGRAPHIC IMAGE: HCPCS | Performed by: DENTIST

## 2024-05-31 RX ORDER — AMOXICILLIN 500 MG/1
500 CAPSULE ORAL EVERY 8 HOURS SCHEDULED
Qty: 21 CAPSULE | Refills: 0 | Status: SHIPPED | OUTPATIENT
Start: 2024-05-31 | End: 2024-06-07

## 2024-05-31 NOTE — DENTAL PROCEDURE DETAILS
"Limited Oral Evaluation/Dental Emergency Visit   Patient (Jus Villagomez) presents for dental visit.   Patient consent treatment.  RMH, no changes. Patient reported history of prostate disease.   ASA: II  Pain scale: 8  Chief complain: \"tooth pain and face swelling\".  HPI: patient reported history of severe pain started yesterday and feels his face is swelling on right side. Patient is pointing to upper right canine area. Patient currently is taking Tylenol for dental pain.   Dental History: Patient was seen on 4/2/2024 and 4/11/2024 for limited exams. Patient was advised to come back for COMP and FMX.   Radiograph: PA x-rats taken for #6 and #8  Assessment/Plan: tooth #6 is an abutment of existing fixed bridge and tooth #8 has porcelain crown restoration were done over 15 years ago per patient report.  Tooth #7 is missing. Both teeth are very tender and positive to palpation and percussions. The upper right mid face at canine fossa area with mild swelling. Local gum inflammation. Extensive fixed crown and bridge restorations done over 15 years ago in [place].      DX: symptomatic periapical periodontitis. chronic dental abscess with acute exacerbation. PARL are evident.   Treatment Plan: RCT of teeth #6 and #8.   Referral: STAT to endodontist for evaluation and RCT of teeth #6 and #8  RX: Amoxicillin 500 mg, 21 caps, TID, for 7 days, no refills. Patient denies allergy. Advised for OTC pain medication as needed.  Reviewed oral hygiene and need for recall visits.   POI is given. Patient left satisfied and ambulatory.    NV: COMP and FMX.      "

## (undated) DEVICE — GOWN XL

## (undated) DEVICE — POSITIONER FOAM EGG CRATE ULNAR 2PCS (PINK)

## (undated) DEVICE — MARKING PEN W RULER

## (undated) DEVICE — RETAINER VICERA FISH LG

## (undated) DEVICE — SUT NOVAFIL 0 30" T-19

## (undated) DEVICE — WARMING BLANKET UPPER ADULT

## (undated) DEVICE — STAPLER SKIN INSORB

## (undated) DEVICE — GLV 8 PROTEXIS (WHITE)

## (undated) DEVICE — DRAPE TOWEL BLUE 17" X 24"

## (undated) DEVICE — SUT VICRYL 2-0 18" TIES

## (undated) DEVICE — SUT VICRYL 2-0 27" SH

## (undated) DEVICE — Device

## (undated) DEVICE — SUT MONOCRYL 4-0 18" PS-2

## (undated) DEVICE — SUT NOVAFIL 2-0 30" T-19

## (undated) DEVICE — VENODYNE/SCD SLEEVE CALF MEDIUM

## (undated) DEVICE — PACK GENERAL MINOR

## (undated) DEVICE — SUT VICRYL 3-0 18" PS-2 UNDYED

## (undated) DEVICE — SUT VICRYL 3-0 27" SH

## (undated) DEVICE — APPLICATOR Q TIP 6" WOOD STEM